# Patient Record
(demographics unavailable — no encounter records)

---

## 2019-04-21 NOTE — RAD
TWO VIEWS CHEST:

4/21/19

 

HISTORY: 

Dyspnea.

 

PA and lateral views of the chest  is obtained 

 

Old healed  right 8th and 9th rib fractures seen. Additional left sided old healed rib fracture is al
so seen. 

 

No evidence of acute intrathoracic abnormalities noted. No evidence of effusions, pneumonia, or pneum
othorax seen.

 

IMPRESSION: 

Old healed bilateral rib fractures with no evidence of acute intrathoracic abnormalities noted. 

 

POS: [facility]

## 2019-05-17 NOTE — RAD
CHEST 2 VIEWS:

 

INDICATION: 

COPD exacerbation.

 

COMPARISON: 

Prior exam dated 4/21/2019.

 

FINDINGS: 

The lungs are hyperexpanded but clear.  The patient is rotated to the left on the PA projection sligh
tly limiting exam.  Heart size is within normal limits.  There is a superior end plate compression de
formity of T12 which is stable.

 

IMPRESSION: 

Stable chronic obstructive pulmonary disease change.

 

POS: [facility]